# Patient Record
Sex: MALE | Race: OTHER | URBAN - METROPOLITAN AREA
[De-identification: names, ages, dates, MRNs, and addresses within clinical notes are randomized per-mention and may not be internally consistent; named-entity substitution may affect disease eponyms.]

---

## 2019-06-25 NOTE — PATIENT DISCUSSION
HORSESHOE TEAR RIGHT EYE:  Pt has a horseshoe tear at the 10:00 periphery of the right eye. Pt has noted floaters for the past several months. Refer to Dr. Tennille Martinez.

## 2019-06-26 NOTE — PATIENT DISCUSSION
Post-laser (BERNIE) Counseling:  Treatment of retinal breaks with laser greatly reduces but does not completely eliminate the risk of retinal detachment. Keep any scheduled appointments for re-evaluation of the retina, and report any increase in flashing lights or floaters.

## 2019-06-26 NOTE — PATIENT DISCUSSION
RETINAL HORSESHOE TEAR WITH SMALL RETINAL DETACHMENT, OD: RISKS AND BENEFITS OF ALL TX OPTIONS DISCUSSED, INCLUDING LASER RETINOPEXY, PNEUMATIC RETINOPEXY, OR SURGERY WITH PPV AND/OR SCLERAL BUCKLE. THE PATIENT ELECTED TO PROCEED TODAY WITH LASER RETINOPEXY TREATMENT (BERNIE).

## 2019-06-26 NOTE — PATIENT DISCUSSION
PLAQUENIL:  NO EVIDENCE OF RETINAL TOXICITY. CONTINUE ANNUAL SCREENING WITH DILATED EXAM AND OCT MACULA. COORDINATE ANNUAL HVF 10-2 OU WITH REFERRING PROVIDER - DR Coco Araujo.

## 2019-07-03 NOTE — PATIENT DISCUSSION
MAC ON DETACHMENT OD TODAY. UNABLE TO FIND SURGEON TO DO SURGERY THIS WEEK DUE TO HOLIDAY. GAS INJECTED IN OFFICE TODAY. PT WILL COME IN ON FRIDAY FOR IOP CHECK. SCHEDULED FOR MONDAY TO SEE DR Ortiz Navarrete FOR POSSIBLE SURGERY ON TUESDAY. PT TO CALL WITH ANY DECREASE IN VISION OR INCREASE IN PAIN.

## 2019-07-05 NOTE — PATIENT DISCUSSION
New Prescription: atropine (atropine): drops: 1% 1 drop twice a day as directed into affected eye 07-

## 2019-07-05 NOTE — PATIENT DISCUSSION
New Prescription: ciprofloxacin (ciprofloxacin): drops: 0.3% 1 drop four times a day as directed into affected eye 07-

## 2019-07-08 NOTE — PATIENT DISCUSSION
New Prescription: prednisolone acetate (prednisolone acetate): drops,suspension: 1% 1 drop as directed into affected eye 07-

## 2019-07-08 NOTE — PATIENT DISCUSSION
RETINAL DETACHMENT, OD:  ALL TX OPTIONS DISCUSSED INCLUDING OBSERVATION, FILL-IN LASER, OR RETINAL DETACHMENT REPAIR SURGERY. PATIENT ELECTED TO SCHEDULE PPV / Debbora Mary / LASER.

## 2019-07-08 NOTE — PATIENT DISCUSSION
Retinal Detachment Counseling:  Discussed the need for an intra-ocular gas bubble. Reviewed need to avoid air travel, SCUBA diving, or travel at high altitude until bubble resolved. Vision typically does not return completely to normal because of some degree of permanent damage to the retina when the retinal detachment occurs. Even with successful attachment of the retina, there can still be permanent blurriness, distortion, or blockage of part of the central vision.

## 2019-07-10 NOTE — PATIENT DISCUSSION
LEFT SIDE DOWN POSITIONING FOR 1 WEEK. KEEP GAS BRACELET IN PLACE UNTIL BUBBLE GONE.  AVOID AIR TRAVEL, SCUBA DIVING, OR TRAVEL AT ALTITUDE UNTIL GAS BUBBLE HAS RESOLVED.

## 2019-07-10 NOTE — PATIENT DISCUSSION
Continue: prednisolone acetate (prednisolone acetate): drops,suspension: 1% 1 drop as directed into affected eye 07-

## 2019-07-10 NOTE — PATIENT DISCUSSION
Continue: ciprofloxacin (ciprofloxacin): drops: 0.3% 1 drop four times a day as directed into affected eye 07-

## 2019-07-10 NOTE — PATIENT DISCUSSION
POST-OP DAY 1 EXAM: PPV/SF6 OD. Doing well today. Retina attached. IOP within acceptable limits. Start eyedrops in the surgical eye as instructed: Pred 4x/day, Cipro 4x/day, Atropine 2x/day. Take tylenol or ibuprofen for any mild eye pain or pressure. Retinal detachment and endophthalmitis precautions reviewed. Instructed to call immediately for worsening vision, eye pain, or eye discharge.

## 2019-07-17 NOTE — PATIENT DISCUSSION
POST-OP WEEK 1 EXAM: S/P PPV/SF6 OD. Doing well today. Retina attached. IOP within acceptable limits. Continue eyedrops in the surgical eye as instructed. Begin Pred taper. Discontinue Cipro and Atropine. Retinal detachment and endophthalmitis precautions reviewed. Instructed to call immediately for worsening vision, eye pain, or eye discharge.

## 2019-07-31 NOTE — PATIENT DISCUSSION
NO POSITIONING RESTRICTIONS. REVIEWED PVD PRECAUTIONS WITH PATIENT AND ADVISED TO CALL IF EXPERIENCES NEW FLASHES OF LIGHT, INCREASE IN FLOATERS, OR DECREASE VISION IN EITHER EYE.

## 2019-07-31 NOTE — PATIENT DISCUSSION
POST-OP WEEK 3 EXAM: S/P PPV/SF6 OD. Doing well today. Retina attached. IOP within acceptable limits. Continue eyedrops in the surgical eye as instructed. Begin Pred taper. Discontinue Cipro and Atropine. Retinal detachment and endophthalmitis precautions reviewed. Instructed to call immediately for worsening vision, eye pain, or eye discharge.

## 2019-08-28 NOTE — PATIENT DISCUSSION
POST-OP MONTH 2 EXAM: S/P PPV/SF6 OD. Doing well today. Retina attached. IOP within acceptable limits. Continue eyedrops in the surgical eye as instructed. Begin Pred taper. Discontinue Cipro and Atropine. Retinal detachment and endophthalmitis precautions reviewed. Instructed to call immediately for worsening vision, eye pain, or eye discharge.

## 2019-08-28 NOTE — PATIENT DISCUSSION
EPIMACULAR MEMBRANE, OD:  VISUALLY SIGNIFICANT. SECONDARY TO PVR. DISCUSSED SURGICAL OPTIONS - PATIENT AGREED TO CONTINUE TO OBSERVE AT THIS TIME. RETURN AS SCHEDULED FOR OCT / EVALUATION.

## 2019-09-25 NOTE — PATIENT DISCUSSION
New Prescription: atropine (atropine): drops: 1% 1 drop twice a day as directed into affected eye 09-

## 2019-09-25 NOTE — PATIENT DISCUSSION
New Prescription: prednisolone acetate (prednisolone acetate): drops,suspension: 1% 1 drop as directed into affected eye 09-

## 2019-09-25 NOTE — PATIENT DISCUSSION
New Prescription: ciprofloxacin (ciprofloxacin): drops: 0.3% 1 drop four times a day as directed into affected eye 09-

## 2019-09-25 NOTE — PATIENT DISCUSSION
POST-OP MONTH 3 EXAM: S/P PPV/SF6 OD. Doing well today. Retina attached. IOP within acceptable limits. Continue eyedrops in the surgical eye as instructed. Begin Pred taper. Discontinue Cipro and Atropine. Retinal detachment and endophthalmitis precautions reviewed. Instructed to call immediately for worsening vision, eye pain, or eye discharge.

## 2019-10-16 NOTE — PATIENT DISCUSSION
Continue: prednisolone acetate (prednisolone acetate): drops,suspension: 1% 1 drop as directed into affected eye 09-

## 2019-10-16 NOTE — PATIENT DISCUSSION
POST-OP DAY 1 EXAM: S/P PPV/MP OD; Doing well today. Retina attached. IOP within acceptable limits. Start eyedrops in the surgical eye as instructed: Pred 4x/day, Cipro 4x/day, Atropine 2x/day. Take tylenol or ibuprofen for any mild eye pain or pressure. Retinal detachment and endophthalmitis precautions reviewed. Instructed to call immediately for worsening vision, eye pain, or eye discharge.

## 2019-10-23 NOTE — PATIENT DISCUSSION
POST-OP WEEK 1 EXAM: S/P PPV/MP OD; Doing well today. Retina attached. IOP within acceptable limits. Continue eyedrops in the surgical eye as instructed. Begin Pred taper. Discontinue Cipro and Atropine. Retinal detachment and endophthalmitis precautions reviewed. Instructed to call immediately for worsening vision, eye pain, or eye discharge.

## 2019-10-23 NOTE — PATIENT DISCUSSION
Continue: ciprofloxacin (ciprofloxacin): drops: 0.3% 1 drop four times a day as directed into right eye

## 2019-11-13 NOTE — PATIENT DISCUSSION
CYSTOID MACULAR EDEMA, OD: IMPROVING. PRESCRIBED PREDNISOLONE 2X/DAY OD, UNTIL BOTTLE EMPTY. RETURN FOR FOLLOW-UP AS SCHEDULED.

## 2019-11-13 NOTE — PATIENT DISCUSSION
Continue: prednisolone acetate (prednisolone acetate): drops,suspension: 1% 1 drop twice a day into right eye 09-

## 2019-11-13 NOTE — PATIENT DISCUSSION
POST-OP MONTH 1 EXAM: S/P PPV/MP OD; : Doing well today. Retina attached. IOP within acceptable limits. Finish eyedrops in the surgical eye as instructed. Retinal detachment and endophthalmitis precautions reviewed. Instructed to call immediately for worsening vision, eye pain, or eye discharge.

## 2020-01-22 NOTE — PATIENT DISCUSSION
CYSTOID MACULAR EDEMA, OD: IMPROVED AFTER PPV/MEMBRANE PEELING AND THEN SUBTENON TRIAMCINOLONE INJECTION. PATIENT WOULD LIKE TO PROCEED WITH PHACO. RETURN FOR FOLLOW-UP AFTER PHACO TO EVALUATE FOR REPEAT PSTK INJECTION.

## 2020-01-22 NOTE — PATIENT DISCUSSION
RETINA IS ATTACHED OU: S/P PPV OD; S/P LASER RETINOPEXY OS; NO NEW HOLES OR TEARS SEEN ON DILATED EXAM TODAY.  RETINAL DETACHMENT SIGNS AND SYMPTOMS REVIEWED

## 2020-01-22 NOTE — PATIENT DISCUSSION
CLEARED FOR PHACO OU. REFER TO DR RODRIGUEZ FOR VISUALLY SIGNIFICANT CATARACTS OD&gt;OS. DISCUSSED GUARDED VISUAL PROGNOSIS OD, DUE TO HISTORY OF RETINAL DETACHMENT REPAIR.

## 2020-01-22 NOTE — PATIENT DISCUSSION
SLE ON PLAQUENIL:  NO EVIDENCE OF RETINAL TOXICITY. CONTINUE ANNUAL SCREENING WITH DILATED EXAM AND OCT MACULA. COORDINATE ANNUAL HVF 10-2 OU WITH REFERRING PROVIDER.

## 2020-03-16 NOTE — PATIENT DISCUSSION
Kavitha OD&gt;OS - VS TO PT, LIMITED POTENTIAL VA OD DISCUSSED WITH PT INCLUDING WORSENING OF SRF. SCHEDULE OD ONLY. PT WILL NEED LONGTERM NSAID AFTER SURGERY.

## 2020-03-16 NOTE — PATIENT DISCUSSION
DRUSENOID PED OS - NO SIGN OF PLAQUENIL TOXICITY BUT MACULAR PIGMENTARY CHANGES CAN BE RELATIVE CONTRAINDICATION.  PATIENT TO DISCUSS OPTIONS / ALTERNATIVES WITH DR Slava Woodward

## 2020-04-24 NOTE — PATIENT DISCUSSION
RETINA IS ATTACHED OU: S/P PPV MP OD; LASER RETINOPEXY, OU; NO HOLES OR TEARS SEEN ON DILATED EXAM TODAY.  RETINAL DETACHMENT SIGNS AND SYMPTOMS REVIEWED

## 2020-04-24 NOTE — PATIENT DISCUSSION
CYSTOID MACULAR EDEMA, OD:  MODERATE IMPROVEMENT AFTER FIRST PSTK INJECTION. RECOMMEND SUBTENON'S KENALOG INJECTION TODAY.

## 2020-04-24 NOTE — PATIENT DISCUSSION
PLAQUENIL:  DUE TO MACULAR CHANGES, PATIENT WILL DISCUSS ALTERNATIVE Tamar Moore (RHEUMATOLOGY). NO EVIDENCE OF RETINAL TOXICITY. CONTINUE ANNUAL SCREENING WITH DILATED EXAM AND OCT MACULA. COORDINATE ANNUAL HVF 10-2 OU WITH REFERRING PROVIDER DR RODRIGUEZ.

## 2020-05-26 NOTE — PATIENT DISCUSSION
Kavitha OD&gt;OS - VS TO PT, LIMITED POTENTIAL VA OD DISCUSSED WITH PT INCLUDING WORSENING OF MACULAR EDEMA, PROGRESSIVE SRF. PT UNDERSTANDS INCREASED RISK OF COMPLICATIONS AND LIMIT TO BCVA AFTER SX. SCHEDULE OD ONLY. PT WILL NEED LONGTERM NSAID AFTER SURGERY. Jose Kay.

## 2020-05-26 NOTE — PATIENT DISCUSSION
New Prescription: prednisol ace-gatiflox-bromfen (prednisol ace-gatiflox-bromfen): drops,suspension: 1-0.5-0.075% 1 drop three times a day into affected eye 05-

## 2020-05-26 NOTE — PATIENT DISCUSSION
S/P LASER RETINOPEXY, OU; NO HOLES OR TEARS SEEN ON DILATED EXAM TODAY.  RETINAL DETACHMENT SIGNS AND SYMPTOMS REVIEWED

## 2020-05-26 NOTE — PATIENT DISCUSSION
PLAQUENIL - MACULAR PATHOLOGY IS RELATIVE CONTRAINDICATION. DISCUSS ALTERNATIVES DR Tati Womack (RHEUMATOLOGY).  CONTINUE ANNUAL HVF 10-2 OU

## 2020-06-19 NOTE — PATIENT DISCUSSION
Post-Op Instructions: The patient was instructed in the proper use of post-operative eye drops: Omni in the Surgical eye 3x a day for 3 weeks, then discontinue. Call back instructions, retinal detachment and endophthalmitis precautions given.

## 2020-06-19 NOTE — PATIENT DISCUSSION
Continue: prednisol ace-gatiflox-bromfen (prednisol ace-gatiflox-bromfen): drops,suspension: 1-0.5-0.075% 1 drop three times a day into affected eye 05-

## 2020-07-06 NOTE — PATIENT DISCUSSION
S/P CATARACT SURGERY WITH IOL, OD.  DOING WELL. CONTINUE DROPS AS DIRECTED. SPECS RX OFFERED. RETURN FOR FOLLOW-UP IN 6 MONTHS FOR DFE OU OR SOONER IF NEEDED.

## 2020-08-17 NOTE — PATIENT DISCUSSION
CYSTOID MACULAR EDEMA, OD: IMPROVING. RESTART PRED-GATI-BROM 2X/DAY OD UNTIL NEXT VISIT (UNTIL BOTTLE EMPTY). RETURN FOR FOLLOW UP AS SCHEDULED.

## 2020-08-17 NOTE — PATIENT DISCUSSION
PLAQUENIL: NO EVIDENCE OF RETINAL TOXICITY. CONTINUE ANNUAL SCREENING WITH DILATED EXAM AND OCT MACULA. COORDINATE ANNUAL HVF 10-2 OU WITH REFERRING PROVIDER DR RODRIGUEZ.

## 2020-08-17 NOTE — PATIENT DISCUSSION
RETINA IS ATTACHED OU: S/P PPV/MP OD; PVD OS; LASER RETINOPEXY, OU; NO HOLES OR TEARS SEEN ON DILATED EXAM TODAY.  RETINAL DETACHMENT SIGNS AND SYMPTOMS REVIEWED

## 2020-11-06 NOTE — PATIENT DISCUSSION
PLAQUENIL DISCUSSION: I have discussed the implications of Plaquenil with the patient and the potential for retinal injury. Even though this is uncommon, all patients on Plaquenil should have regular eye exams, and should notify the eye doctor immediately if any new visual changes occur.

## 2020-11-06 NOTE — PATIENT DISCUSSION
CYSTOID MACULAR EDEMA, OD: IMPROVING. PRESCRIBE PRED FORTE 2X/DAY OD; PROLENSA 1X/DAY OD (SAMPLE GIVEN). RETURN FOR FOLLOW UP AS SCHEDULED.

## 2020-11-06 NOTE — PATIENT DISCUSSION
New Prescription: prednisolone acetate (prednisolone acetate): drops,suspension: 1% 1 drop twice a day into right eye 11-

## 2021-01-11 NOTE — PATIENT DISCUSSION
CYSTOID MACULAR EDEMA, OD: IMPROVING. PRESCRIBE PRED FORTE 2X/DAY OD; PROLENSA 1X/DAY OD. RETURN FOR FOLLOW UP AS SCHEDULED.

## 2021-01-11 NOTE — PATIENT DISCUSSION
NO EVIDENCE OF RETINAL HOLE OR TEARS ON EXAM AFTER BLUNT TRAUMA. RD PRECUATIONS DISCUSSED. IMPROVING EDEMA OD.  KEEP FU DR Justin Balderrama AS SCHEDULED

## 2021-01-27 NOTE — PATIENT DISCUSSION
RETINA IS ATTACHED OU: S/P PPV/MP OD; PVD OS; S/P LASER RETINOPEXY, OU; NO HOLES OR TEARS SEEN ON DILATED EXAM TODAY.  RETINAL DETACHMENT SIGNS AND SYMPTOMS REVIEWED

## 2021-01-27 NOTE — PATIENT DISCUSSION
CYSTOID MACULAR EDEMA, OD: RESOLVED. STOP PROLENSA. CONTINUE PRED FORTE 1X/DAY FOR 3 MONTHS UNTIL NEXT VISIT. RETURN FOR FOLLOW UP AS SCHEDULED.

## 2021-02-23 NOTE — PATIENT DISCUSSION
Also, please do not hesitate to call us if you have any concerns not addressed by this information. Please call [phone] and we will do everything we can to help you during this period.

## 2021-07-14 NOTE — PATIENT DISCUSSION
Patient presents with persistent ptosis right upper eyelid, gave sample of Upneeq ophthalmic drops. The patient will try the drops and if she would like an rx for it she will call the office. Patient will follow up if persistent ptosis of the right upper eyelid worsens and she would like to proceed with surgery.

## 2021-07-19 NOTE — PATIENT DISCUSSION
MACULAR RETINAL PIGMENT EPITHELIAL DETACHMENT OS:  NO DEFINITE SIGNS OF AGE-RELATED MACULAR DEGENERATION. RETURN AS SCHEDULED.

## 2021-07-19 NOTE — PATIENT DISCUSSION
REQUESTED TO RETURN TO DR RODRIGUEZ FOR GENERAL EXAM WITH REFRACTION AND PHACO EVAL. CLEARED FOR PHACO OS WHEN PATIENT IS READY.

## 2021-07-19 NOTE — PATIENT DISCUSSION
CYSTOID MACULAR EDEMA, OD: RESOLVED. NO TREATMENT REQUIRED AT THIS TIME.  STOP PROLENSA AND PRED FORTE. RETURN FOR FOLLOW UP AS SCHEDULED.

## 2022-02-08 NOTE — PATIENT DISCUSSION
SIGNIFICANT PTOSIS, RIGHT UPPER EYELID; RECOMMEND LEVATOR ADVANCEMENT, RIGHT UPPER EYELID. I have examined the patient and reviewed the photos and visual fields.  The upper eyelid margin in ptosis obstructs the visual axis causing  impairment of the peripheral visual field which improves with taping.  I have discussed with the patient the option of levator advancement surgery to lift the upper eyelid position and improve the functional visual field.  We have discussed the risks and benefits of the surgery at length as well as the location of the incision and the recovery process.  The patient understands the surgery, has had all questions answered   and desires to proceed with the surgery as explained.

## 2022-06-30 RX ORDER — PANTOPRAZOLE SODIUM 40 MG/1
TABLET, DELAYED RELEASE ORAL
COMMUNITY

## 2022-06-30 RX ORDER — AZELASTINE 1 MG/ML
SPRAY, METERED NASAL
COMMUNITY

## 2022-06-30 RX ORDER — VENLAFAXINE 75 MG/1
TABLET ORAL
COMMUNITY

## 2022-06-30 RX ORDER — FLUTICASONE PROPIONATE 50 MCG
SPRAY, SUSPENSION (ML) NASAL
COMMUNITY

## 2022-06-30 RX ORDER — MONTELUKAST SODIUM 10 MG/1
TABLET ORAL
COMMUNITY

## 2022-06-30 RX ORDER — LISINOPRIL 10 MG/1
TABLET ORAL
COMMUNITY
Start: 2021-11-04 | End: 2022-10-10 | Stop reason: SDUPTHER

## 2022-06-30 RX ORDER — TRAZODONE HYDROCHLORIDE 50 MG/1
TABLET ORAL
COMMUNITY

## 2022-06-30 RX ORDER — ACYCLOVIR 400 MG/1
TABLET ORAL
COMMUNITY

## 2022-06-30 RX ORDER — ROSUVASTATIN CALCIUM 10 MG/1
TABLET, COATED ORAL
COMMUNITY

## 2022-07-29 NOTE — PATIENT DISCUSSION
VISUALLY SIGNIFICANT: The risks, benefits and alternatives of surgery were discussed with the patient. After this discussion, I recommend waiting on surgery at this time.

## 2022-08-18 NOTE — PATIENT DISCUSSION
Monitor.

## 2022-11-09 ENCOUNTER — NEW PATIENT (OUTPATIENT)
Dept: URBAN - METROPOLITAN AREA CLINIC 14 | Facility: CLINIC | Age: 62
End: 2022-11-09

## 2022-11-09 DIAGNOSIS — H40.053: ICD-10-CM

## 2022-11-09 PROCEDURE — 92002 INTRM OPH EXAM NEW PATIENT: CPT

## 2022-11-09 ASSESSMENT — KERATOMETRY
OS_K2POWER_DIOPTERS: 44.75
OD_AXISANGLE2_DEGREES: 3
OS_AXISANGLE_DEGREES: 165
OD_AXISANGLE_DEGREES: 93
OS_K1POWER_DIOPTERS: 43.75
OD_K1POWER_DIOPTERS: 43.50
OD_K2POWER_DIOPTERS: 44.00
OS_AXISANGLE2_DEGREES: 75

## 2022-11-09 ASSESSMENT — VISUAL ACUITY
OS_SC: CF 3FT
OS_CC: 20/400
OS_BCVA: 20/50
OD_SC: 20/100
OD_CC: 20/30
OD_BCVA: 20/20

## 2022-11-09 ASSESSMENT — TONOMETRY
OD_IOP_MMHG: 27
OD_IOP_MMHG: 27
OS_IOP_MMHG: 37
OS_IOP_MMHG: 49
OS_IOP_MMHG: 45
OS_IOP_MMHG: 46
OS_IOP_MMHG: 29

## 2022-11-10 ENCOUNTER — ESTABLISHED PATIENT (OUTPATIENT)
Dept: URBAN - METROPOLITAN AREA CLINIC 14 | Facility: CLINIC | Age: 62
End: 2022-11-10

## 2022-11-10 DIAGNOSIS — H40.1132: ICD-10-CM

## 2022-11-10 DIAGNOSIS — H25.13: ICD-10-CM

## 2022-11-10 PROCEDURE — 92136 OPHTHALMIC BIOMETRY: CPT

## 2022-11-10 PROCEDURE — 99214 OFFICE O/P EST MOD 30 MIN: CPT

## 2022-11-10 PROCEDURE — 92133 CPTRZD OPH DX IMG PST SGM ON: CPT

## 2022-11-10 PROCEDURE — 99199ADVT ADVANCED VISION TESTING

## 2022-11-10 ASSESSMENT — TONOMETRY
OD_IOP_MMHG: 13
OS_IOP_MMHG: 15

## 2022-11-10 ASSESSMENT — KERATOMETRY
OS_K1POWER_DIOPTERS: 43.75
OS_AXISANGLE_DEGREES: 165
OS_K2POWER_DIOPTERS: 44.75
OD_AXISANGLE_DEGREES: 93
OD_AXISANGLE2_DEGREES: 3
OD_K2POWER_DIOPTERS: 44.00
OD_K1POWER_DIOPTERS: 43.50
OS_AXISANGLE2_DEGREES: 75

## 2022-11-10 ASSESSMENT — VISUAL ACUITY
OD_BCVA: 20/20
OS_SC: CF 3FT
OD_SC: 20/100
OD_CC: 20/30
OS_CC: 20/60
OS_BCVA: 20/50

## 2022-11-16 ENCOUNTER — ESTABLISHED PATIENT (OUTPATIENT)
Dept: URBAN - METROPOLITAN AREA CLINIC 14 | Facility: CLINIC | Age: 62
End: 2022-11-16

## 2022-11-29 ENCOUNTER — ESTABLISHED PATIENT (OUTPATIENT)
Dept: URBAN - METROPOLITAN AREA CLINIC 9 | Facility: CLINIC | Age: 62
End: 2022-11-29

## 2022-11-29 DIAGNOSIS — H25.13: ICD-10-CM

## 2022-11-29 DIAGNOSIS — H52.7: ICD-10-CM

## 2022-11-29 PROCEDURE — 99211NC NO CHARGE VISIT

## 2022-11-29 ASSESSMENT — KERATOMETRY
OD_AXISANGLE2_DEGREES: 3
OS_K2POWER_DIOPTERS: 44.75
OD_AXISANGLE_DEGREES: 93
OS_AXISANGLE2_DEGREES: 75
OS_K1POWER_DIOPTERS: 43.75
OD_K2POWER_DIOPTERS: 44.00
OS_AXISANGLE_DEGREES: 165
OD_K1POWER_DIOPTERS: 43.50

## 2022-11-29 ASSESSMENT — VISUAL ACUITY
OS_CC: 20/200
OD_CC: 20/40
OS_PH: 20/60

## 2022-12-01 ASSESSMENT — KERATOMETRY
OS_AXISANGLE2_DEGREES: 75
OD_K2POWER_DIOPTERS: 44.00
OD_AXISANGLE_DEGREES: 93
OD_AXISANGLE2_DEGREES: 3
OS_K2POWER_DIOPTERS: 44.75
OS_K1POWER_DIOPTERS: 43.75
OS_AXISANGLE_DEGREES: 165
OD_K1POWER_DIOPTERS: 43.50

## 2023-01-04 ENCOUNTER — OFFICE (OUTPATIENT)
Dept: URBAN - METROPOLITAN AREA CLINIC 122 | Facility: CLINIC | Age: 63
Setting detail: OPHTHALMOLOGY
End: 2023-01-04
Payer: COMMERCIAL

## 2023-01-04 DIAGNOSIS — H25.13: ICD-10-CM

## 2023-01-04 DIAGNOSIS — H40.013: ICD-10-CM

## 2023-01-04 DIAGNOSIS — H33.002: ICD-10-CM

## 2023-01-04 DIAGNOSIS — H43.393: ICD-10-CM

## 2023-01-04 DIAGNOSIS — H35.372: ICD-10-CM

## 2023-01-04 PROCEDURE — 92133 CPTRZD OPH DX IMG PST SGM ON: CPT | Performed by: OPHTHALMOLOGY

## 2023-01-04 PROCEDURE — 92012 INTRM OPH EXAM EST PATIENT: CPT | Performed by: OPHTHALMOLOGY

## 2023-01-04 ASSESSMENT — REFRACTION_CURRENTRX
OS_VPRISM_DIRECTION: SV
OS_CYLINDER: -0.50
OD_SPHERE: -0.50
OS_SPHERE: -3.75
OD_CYLINDER: -0.75
OD_OVR_VA: 20/
OS_OVR_VA: 20/
OS_AXIS: 155
OS_ADD: +2.50
OD_AXIS: 105
OD_ADD: +2.50
OD_VPRISM_DIRECTION: SV

## 2023-01-04 ASSESSMENT — REFRACTION_AUTOREFRACTION
OD_AXIS: 110
OS_CYLINDER: -0.75
OD_CYLINDER: -0.75
OD_SPHERE: -0.50
OS_SPHERE: -3.50
OS_AXIS: 111

## 2023-01-04 ASSESSMENT — CONFRONTATIONAL VISUAL FIELD TEST (CVF)
OS_FINDINGS: FULL
OD_FINDINGS: FULL

## 2023-01-04 ASSESSMENT — TONOMETRY
OD_IOP_MMHG: 18
OS_IOP_MMHG: 16

## 2023-01-04 ASSESSMENT — REFRACTION_MANIFEST
OD_AXIS: 105
OD_CYLINDER: -0.75
OS_AXIS: 155
OD_ADD: +2.50
OS_ADD: +2.50
OS_SPHERE: -3.75
OD_SPHERE: -0.50
OS_CYLINDER: -0.50

## 2023-01-04 ASSESSMENT — SPHEQUIV_DERIVED
OS_SPHEQUIV: -3.875
OD_SPHEQUIV: -0.875
OS_SPHEQUIV: -4
OD_SPHEQUIV: -0.875

## 2023-01-04 ASSESSMENT — VISUAL ACUITY
OS_BCVA: 20/20
OD_BCVA: 20/100

## 2023-01-04 ASSESSMENT — PACHYMETRY
OD_CT_CORRECTION: 1
OS_CT_UM: 527
OS_CT_CORRECTION: 1
OD_CT_UM: 527

## 2023-01-11 NOTE — PATIENT DISCUSSION
LETTER TO DR Crisostomo Friend: KINDLY REQUEST MEDICAL CLEARANCE FORM COMPLETED FOR MAC ANESTHESIA. No

## 2023-01-18 ENCOUNTER — POST-OP (OUTPATIENT)
Dept: URBAN - METROPOLITAN AREA CLINIC 6 | Facility: CLINIC | Age: 63
End: 2023-01-18

## 2023-01-18 DIAGNOSIS — Z98.890: ICD-10-CM

## 2023-01-18 DIAGNOSIS — Z96.1: ICD-10-CM

## 2023-01-18 PROCEDURE — 99024 POSTOP FOLLOW-UP VISIT: CPT

## 2023-01-18 ASSESSMENT — KERATOMETRY
OD_AXISANGLE2_DEGREES: 3
OD_K1POWER_DIOPTERS: 43.50
OS_K1POWER_DIOPTERS: 43.75
OS_AXISANGLE_DEGREES: 165
OD_K2POWER_DIOPTERS: 44.00
OD_AXISANGLE_DEGREES: 93
OS_AXISANGLE2_DEGREES: 75
OS_K2POWER_DIOPTERS: 44.75

## 2023-01-18 ASSESSMENT — VISUAL ACUITY
OD_SC: 20/50-1
OS_CC: 20/20

## 2023-01-18 ASSESSMENT — TONOMETRY
OS_IOP_MMHG: 12
OD_IOP_MMHG: 12

## 2023-04-04 ENCOUNTER — ESTABLISHED PATIENT (OUTPATIENT)
Dept: URBAN - METROPOLITAN AREA CLINIC 9 | Facility: CLINIC | Age: 63
End: 2023-04-04

## 2023-04-04 DIAGNOSIS — H26.492: ICD-10-CM

## 2023-04-04 PROCEDURE — 99214 OFFICE O/P EST MOD 30 MIN: CPT

## 2023-04-04 ASSESSMENT — VISUAL ACUITY
OS_CC: 20/25-1
OS_GLARE: 20/80
OS_BCVA: 20/25-1
OD_GLARE: 20/400
OD_CC: 20/20-1
OD_BCVA: 20/20

## 2023-04-04 ASSESSMENT — KERATOMETRY
OD_K2POWER_DIOPTERS: 44.25
OD_AXISANGLE2_DEGREES: 55
OS_AXISANGLE2_DEGREES: 84
OS_K1POWER_DIOPTERS: 43.50
OS_AXISANGLE_DEGREES: 174
OS_K2POWER_DIOPTERS: 44.50
OD_AXISANGLE_DEGREES: 145
OD_K1POWER_DIOPTERS: 43.75

## 2023-04-04 ASSESSMENT — TONOMETRY
OD_IOP_MMHG: 15
OS_IOP_MMHG: 14

## 2023-05-17 ENCOUNTER — ESTABLISHED PATIENT (OUTPATIENT)
Dept: URBAN - METROPOLITAN AREA CLINIC 14 | Facility: CLINIC | Age: 63
End: 2023-05-17

## 2023-05-17 DIAGNOSIS — Z98.890: ICD-10-CM

## 2023-05-17 PROCEDURE — 99024 POSTOP FOLLOW-UP VISIT: CPT

## 2023-05-17 ASSESSMENT — TONOMETRY
OD_IOP_MMHG: 13
OS_IOP_MMHG: 13

## 2023-05-17 ASSESSMENT — VISUAL ACUITY
OD_SC: 20/30
OS_BCVA: 20/25
OD_BCVA: 20/20
OS_SC: 20/25

## 2023-06-12 ENCOUNTER — OFFICE (OUTPATIENT)
Dept: URBAN - METROPOLITAN AREA CLINIC 122 | Facility: CLINIC | Age: 63
Setting detail: OPHTHALMOLOGY
End: 2023-06-12
Payer: COMMERCIAL

## 2023-06-12 DIAGNOSIS — H43.393: ICD-10-CM

## 2023-06-12 DIAGNOSIS — H25.11: ICD-10-CM

## 2023-06-12 DIAGNOSIS — H10.012: ICD-10-CM

## 2023-06-12 DIAGNOSIS — H40.013: ICD-10-CM

## 2023-06-12 DIAGNOSIS — H33.002: ICD-10-CM

## 2023-06-12 DIAGNOSIS — H35.372: ICD-10-CM

## 2023-06-12 PROCEDURE — 92012 INTRM OPH EXAM EST PATIENT: CPT | Performed by: OPHTHALMOLOGY

## 2023-06-12 ASSESSMENT — REFRACTION_AUTOREFRACTION
OS_SPHERE: -3.50
OD_CYLINDER: -0.75
OD_SPHERE: -0.50
OS_AXIS: 111
OD_AXIS: 110
OS_CYLINDER: -0.75

## 2023-06-12 ASSESSMENT — REFRACTION_MANIFEST
OS_SPHERE: -3.75
OS_AXIS: 155
OS_ADD: +2.50
OS_CYLINDER: -0.50
OD_SPHERE: -0.50
OD_AXIS: 105
OD_CYLINDER: -0.75
OD_ADD: +2.50

## 2023-06-12 ASSESSMENT — TONOMETRY
OS_IOP_MMHG: 12
OD_IOP_MMHG: 14

## 2023-06-12 ASSESSMENT — REFRACTION_CURRENTRX
OD_VPRISM_DIRECTION: SV
OD_CYLINDER: -0.75
OS_AXIS: 155
OD_AXIS: 105
OD_SPHERE: -0.50
OS_VPRISM_DIRECTION: SV
OS_OVR_VA: 20/
OS_CYLINDER: -0.50
OD_ADD: +2.50
OS_ADD: +2.50
OS_SPHERE: -3.75
OD_OVR_VA: 20/

## 2023-06-12 ASSESSMENT — SPHEQUIV_DERIVED
OD_SPHEQUIV: -0.875
OS_SPHEQUIV: -3.875
OS_SPHEQUIV: -4
OD_SPHEQUIV: -0.875

## 2023-06-12 ASSESSMENT — VISUAL ACUITY
OS_BCVA: 20/50-
OD_BCVA: 20/25+2

## 2023-06-12 ASSESSMENT — PACHYMETRY
OS_CT_UM: 527
OD_CT_UM: 527
OD_CT_CORRECTION: 1
OS_CT_CORRECTION: 1

## 2023-06-23 NOTE — PATIENT DISCUSSION
"The patient is given the patient education document:  ""Eyelid Disorders""
Alejandro Harris M.D.
Amberly العلي M.D.
COUNSELING:
Comments:
Communication Method: Direct
Communication Method: Fax
Complete
External photos, Right Eye (Today)
Eye Tests:
General:
HVF - Suprathreshold, Right Eye (Today)
Kan Visual Field 36 point screen: I have reviewed the visual fields both taped and untaped on this patient which demonstrate significant obstruction of the patient's peripheral visual field on the right eye.
Letter:  Follow-up
Monica Ramirez M.D.
Notify Provider:
PEGGY GonzalezP
PHOTOGRAPHS: I have reviewed the external ocular photographs of this patient which show the following: significant ptosis of the right upper eyelid.
PTOSIS, OD:  SCHEDULE LEVATOR ADVANCEMENT, OD.
Patient Education:
Ptosis Counseling:  I have examined the patient and reviewed the photos and visual fields. The upper eyelid margin in ptosis obstructs the visual axis causing  impairment of the peripheral visual field which improves with taping. I have discussed with the patient the option of levator advancement surgery to lift the upper eyelid position and improve the functional visual field. We have discussed the risks and benefits of the surgery at length as well as the location of the incision and the recovery process. The patient understands the surgery, has had all questions answered   and desires to proceed with the surgery as explained.
<<-----Click here for Discharge Medication Review

## 2023-06-28 ENCOUNTER — OFFICE (OUTPATIENT)
Dept: URBAN - METROPOLITAN AREA CLINIC 122 | Facility: CLINIC | Age: 63
Setting detail: OPHTHALMOLOGY
End: 2023-06-28
Payer: COMMERCIAL

## 2023-06-28 DIAGNOSIS — H35.372: ICD-10-CM

## 2023-06-28 DIAGNOSIS — H33.002: ICD-10-CM

## 2023-06-28 DIAGNOSIS — H35.363: ICD-10-CM

## 2023-06-28 DIAGNOSIS — H40.1131: ICD-10-CM

## 2023-06-28 DIAGNOSIS — H25.11: ICD-10-CM

## 2023-06-28 DIAGNOSIS — H16.223: ICD-10-CM

## 2023-06-28 DIAGNOSIS — H43.393: ICD-10-CM

## 2023-06-28 DIAGNOSIS — H10.012: ICD-10-CM

## 2023-06-28 PROCEDURE — 92012 INTRM OPH EXAM EST PATIENT: CPT | Performed by: OPHTHALMOLOGY

## 2023-06-28 PROCEDURE — 92083 EXTENDED VISUAL FIELD XM: CPT | Performed by: OPHTHALMOLOGY

## 2023-06-28 PROCEDURE — 92250 FUNDUS PHOTOGRAPHY W/I&R: CPT | Performed by: OPHTHALMOLOGY

## 2023-06-28 ASSESSMENT — TEAR BREAK UP TIME (TBUT)
OD_TBUT: 2+
OS_TBUT: 2+

## 2023-06-28 ASSESSMENT — REFRACTION_MANIFEST
OD_SPHERE: -0.50
OD_AXIS: 105
OS_CYLINDER: -0.50
OS_ADD: +2.50
OD_ADD: +2.50
OD_SPHERE: -1.00
OS_AXIS: 155
OD_CYLINDER: -0.75
OS_SPHERE: -3.75

## 2023-06-28 ASSESSMENT — REFRACTION_CURRENTRX
OD_VPRISM_DIRECTION: SV
OS_AXIS: 175
OS_SPHERE: +0.50
OS_OVR_VA: 20/
OD_AXIS: 105
OS_CYLINDER: -1.00
OD_CYLINDER: -0.75
OS_ADD: +2.50
OS_AXIS: 155
OD_AXIS: 120
OD_SPHERE: -0.50
OS_CYLINDER: -0.50
OS_OVR_VA: 20/
OD_CYLINDER: -0.75
OD_SPHERE: -0.50
OD_OVR_VA: 20/
OS_SPHERE: -3.75
OS_VPRISM_DIRECTION: SV
OD_ADD: +2.50
OD_OVR_VA: 20/

## 2023-06-28 ASSESSMENT — REFRACTION_AUTOREFRACTION
OD_AXIS: 110
OD_CYLINDER: -0.75
OS_AXIS: 111
OS_SPHERE: -3.50
OS_CYLINDER: -0.75
OD_SPHERE: -0.50

## 2023-06-28 ASSESSMENT — PACHYMETRY
OS_CT_UM: 527
OD_CT_CORRECTION: 1
OD_CT_UM: 527
OS_CT_CORRECTION: 1

## 2023-06-28 ASSESSMENT — TONOMETRY
OS_IOP_MMHG: 15
OD_IOP_MMHG: 15

## 2023-06-28 ASSESSMENT — SPHEQUIV_DERIVED
OD_SPHEQUIV: -0.875
OS_SPHEQUIV: -3.875
OS_SPHEQUIV: -4
OD_SPHEQUIV: -0.875

## 2023-06-28 ASSESSMENT — VISUAL ACUITY
OD_BCVA: 20/25+2
OS_BCVA: 20/50-

## 2023-11-17 ENCOUNTER — OFFICE (OUTPATIENT)
Dept: URBAN - METROPOLITAN AREA CLINIC 122 | Facility: CLINIC | Age: 63
Setting detail: OPHTHALMOLOGY
End: 2023-11-17
Payer: COMMERCIAL

## 2023-11-17 DIAGNOSIS — H35.363: ICD-10-CM

## 2023-11-17 DIAGNOSIS — H33.002: ICD-10-CM

## 2023-11-17 DIAGNOSIS — H43.393: ICD-10-CM

## 2023-11-17 DIAGNOSIS — H25.11: ICD-10-CM

## 2023-11-17 DIAGNOSIS — H16.223: ICD-10-CM

## 2023-11-17 DIAGNOSIS — H02.9: ICD-10-CM

## 2023-11-17 DIAGNOSIS — H40.1131: ICD-10-CM

## 2023-11-17 DIAGNOSIS — H35.372: ICD-10-CM

## 2023-11-17 PROCEDURE — 92014 COMPRE OPH EXAM EST PT 1/>: CPT | Performed by: OPHTHALMOLOGY

## 2023-11-17 PROCEDURE — 92133 CPTRZD OPH DX IMG PST SGM ON: CPT | Performed by: OPHTHALMOLOGY

## 2023-11-17 ASSESSMENT — REFRACTION_CURRENTRX
OS_AXIS: 155
OD_OVR_VA: 20/
OS_OVR_VA: 20/
OD_CYLINDER: -0.75
OS_AXIS: 175
OD_CYLINDER: -0.75
OD_AXIS: 105
OS_OVR_VA: 20/
OS_VPRISM_DIRECTION: SV
OS_CYLINDER: -1.00
OS_ADD: +2.50
OD_AXIS: 120
OS_SPHERE: +0.50
OD_VPRISM_DIRECTION: SV
OS_SPHERE: -3.75
OS_CYLINDER: -0.50
OD_ADD: +2.50
OD_OVR_VA: 20/
OD_SPHERE: -0.50
OD_SPHERE: -0.50

## 2023-11-17 ASSESSMENT — LID EXAM ASSESSMENTS
OD_EDEMA: RLL 1+
OS_EDEMA: LLL 1+

## 2023-11-17 ASSESSMENT — REFRACTION_MANIFEST
OS_SPHERE: -3.75
OS_AXIS: 155
OD_SPHERE: -1.00
OD_AXIS: 105
OD_CYLINDER: -0.75
OD_ADD: +2.50
OS_ADD: +2.50
OD_SPHERE: -0.50
OS_CYLINDER: -0.50

## 2023-11-17 ASSESSMENT — REFRACTION_AUTOREFRACTION
OD_CYLINDER: -0.50
OD_SPHERE: -0.50
OD_AXIS: 119
OS_CYLINDER: -0.50
OS_AXIS: 174
OS_SPHERE: PLANO

## 2023-11-17 ASSESSMENT — SPHEQUIV_DERIVED
OS_SPHEQUIV: -4
OD_SPHEQUIV: -0.75
OD_SPHEQUIV: -0.875

## 2023-11-17 ASSESSMENT — TEAR BREAK UP TIME (TBUT)
OS_TBUT: 2+
OD_TBUT: 2+

## 2023-12-18 ENCOUNTER — RX ONLY (RX ONLY)
Age: 63
End: 2023-12-18

## 2023-12-18 ENCOUNTER — OFFICE (OUTPATIENT)
Dept: URBAN - METROPOLITAN AREA CLINIC 122 | Facility: CLINIC | Age: 63
Setting detail: OPHTHALMOLOGY
End: 2023-12-18
Payer: COMMERCIAL

## 2023-12-18 DIAGNOSIS — H16.223: ICD-10-CM

## 2023-12-18 DIAGNOSIS — H35.363: ICD-10-CM

## 2023-12-18 DIAGNOSIS — H25.11: ICD-10-CM

## 2023-12-18 DIAGNOSIS — H40.1131: ICD-10-CM

## 2023-12-18 DIAGNOSIS — H35.372: ICD-10-CM

## 2023-12-18 DIAGNOSIS — H43.393: ICD-10-CM

## 2023-12-18 DIAGNOSIS — H10.013: ICD-10-CM

## 2023-12-18 DIAGNOSIS — H33.002: ICD-10-CM

## 2023-12-18 PROBLEM — H10.012 FOLLICULAR CONJUNCTIVITS ACUTE; LEFT EYE: Status: ACTIVE | Noted: 2023-12-18

## 2023-12-18 PROCEDURE — 92134 CPTRZ OPH DX IMG PST SGM RTA: CPT | Performed by: OPHTHALMOLOGY

## 2023-12-18 PROCEDURE — 92012 INTRM OPH EXAM EST PATIENT: CPT | Performed by: OPHTHALMOLOGY

## 2023-12-18 ASSESSMENT — REFRACTION_CURRENTRX
OS_AXIS: 155
OS_CYLINDER: -0.50
OD_CYLINDER: -0.75
OD_SPHERE: -0.50
OD_AXIS: 120
OS_CYLINDER: -1.00
OD_VPRISM_DIRECTION: SV
OS_ADD: +2.50
OS_OVR_VA: 20/
OD_OVR_VA: 20/
OD_SPHERE: -0.50
OD_CYLINDER: -0.75
OS_SPHERE: -3.75
OS_AXIS: 175
OD_OVR_VA: 20/
OD_AXIS: 105
OS_OVR_VA: 20/
OD_ADD: +2.50
OS_SPHERE: +0.50
OS_VPRISM_DIRECTION: SV

## 2023-12-18 ASSESSMENT — REFRACTION_AUTOREFRACTION
OD_AXIS: 119
OS_SPHERE: PLANO
OS_CYLINDER: -0.50
OS_AXIS: 174
OD_SPHERE: -0.50
OD_CYLINDER: -0.50

## 2023-12-18 ASSESSMENT — LID EXAM ASSESSMENTS
OD_EDEMA: ABSENT
OS_EDEMA: ABSENT

## 2023-12-18 ASSESSMENT — REFRACTION_MANIFEST
OS_SPHERE: -3.75
OD_SPHERE: -1.00
OS_CYLINDER: -0.50
OD_CYLINDER: -0.75
OS_AXIS: 155
OD_ADD: +2.50
OD_AXIS: 105
OD_SPHERE: -0.50
OS_ADD: +2.50

## 2023-12-18 ASSESSMENT — SPHEQUIV_DERIVED
OD_SPHEQUIV: -0.75
OD_SPHEQUIV: -0.875
OS_SPHEQUIV: -4

## 2023-12-18 ASSESSMENT — TEAR BREAK UP TIME (TBUT)
OD_TBUT: 2+
OS_TBUT: 2+

## 2024-01-03 ENCOUNTER — RX ONLY (RX ONLY)
Age: 64
End: 2024-01-03

## 2024-01-03 ENCOUNTER — OFFICE (OUTPATIENT)
Dept: URBAN - METROPOLITAN AREA CLINIC 122 | Facility: CLINIC | Age: 64
Setting detail: OPHTHALMOLOGY
End: 2024-01-03
Payer: COMMERCIAL

## 2024-01-03 DIAGNOSIS — H40.1131: ICD-10-CM

## 2024-01-03 DIAGNOSIS — H35.363: ICD-10-CM

## 2024-01-03 DIAGNOSIS — H25.11: ICD-10-CM

## 2024-01-03 DIAGNOSIS — H33.002: ICD-10-CM

## 2024-01-03 DIAGNOSIS — H43.393: ICD-10-CM

## 2024-01-03 DIAGNOSIS — H16.223: ICD-10-CM

## 2024-01-03 DIAGNOSIS — H10.013: ICD-10-CM

## 2024-01-03 DIAGNOSIS — H35.372: ICD-10-CM

## 2024-01-03 PROBLEM — H10.012 FOLLICULAR CONJUNCTIVITS ACUTE; LEFT EYE: Status: ACTIVE | Noted: 2024-01-03

## 2024-01-03 PROCEDURE — 92012 INTRM OPH EXAM EST PATIENT: CPT | Performed by: OPHTHALMOLOGY

## 2024-01-03 ASSESSMENT — REFRACTION_CURRENTRX
OD_AXIS: 105
OD_AXIS: 120
OS_CYLINDER: -1.00
OD_VPRISM_DIRECTION: SV
OS_ADD: +2.50
OD_OVR_VA: 20/
OS_SPHERE: +0.50
OS_AXIS: 175
OS_SPHERE: -3.75
OS_OVR_VA: 20/
OD_SPHERE: -0.50
OS_OVR_VA: 20/
OD_OVR_VA: 20/
OD_ADD: +2.50
OS_CYLINDER: -0.50
OD_SPHERE: -0.50
OD_CYLINDER: -0.75
OD_CYLINDER: -0.75
OS_VPRISM_DIRECTION: SV
OS_AXIS: 155

## 2024-01-03 ASSESSMENT — SPHEQUIV_DERIVED
OS_SPHEQUIV: -4
OD_SPHEQUIV: -0.875
OD_SPHEQUIV: -0.75

## 2024-01-03 ASSESSMENT — REFRACTION_AUTOREFRACTION
OD_CYLINDER: -0.50
OS_CYLINDER: -0.50
OS_SPHERE: PLANO
OS_AXIS: 174
OD_AXIS: 119
OD_SPHERE: -0.50

## 2024-01-03 ASSESSMENT — REFRACTION_MANIFEST
OD_ADD: +2.50
OS_SPHERE: -3.75
OS_CYLINDER: -0.50
OD_SPHERE: -1.00
OS_ADD: +2.50
OD_CYLINDER: -0.75
OD_AXIS: 105
OD_SPHERE: -0.50
OS_AXIS: 155

## 2024-01-03 ASSESSMENT — LID EXAM ASSESSMENTS
OS_EDEMA: ABSENT
OD_EDEMA: ABSENT

## 2024-01-03 ASSESSMENT — CONFRONTATIONAL VISUAL FIELD TEST (CVF)
OS_FINDINGS: FULL
OD_FINDINGS: FULL

## 2024-01-03 ASSESSMENT — TEAR BREAK UP TIME (TBUT)
OS_TBUT: 2+
OD_TBUT: 2+

## 2024-04-29 ENCOUNTER — OFFICE (OUTPATIENT)
Dept: URBAN - METROPOLITAN AREA CLINIC 122 | Facility: CLINIC | Age: 64
Setting detail: OPHTHALMOLOGY
End: 2024-04-29
Payer: COMMERCIAL

## 2024-04-29 DIAGNOSIS — H33.002: ICD-10-CM

## 2024-04-29 DIAGNOSIS — H16.223: ICD-10-CM

## 2024-04-29 DIAGNOSIS — H35.363: ICD-10-CM

## 2024-04-29 DIAGNOSIS — H40.1131: ICD-10-CM

## 2024-04-29 DIAGNOSIS — H25.11: ICD-10-CM

## 2024-04-29 DIAGNOSIS — H02.824: ICD-10-CM

## 2024-04-29 PROBLEM — H10.012 FOLLICULAR CONJUNCTIVITS ACUTE; LEFT EYE: Status: RESOLVED | Noted: 2024-04-29 | Resolved: 2024-04-29

## 2024-04-29 PROCEDURE — 92012 INTRM OPH EXAM EST PATIENT: CPT | Performed by: OPHTHALMOLOGY

## 2024-04-29 PROCEDURE — 92083 EXTENDED VISUAL FIELD XM: CPT | Performed by: OPHTHALMOLOGY

## 2024-04-29 ASSESSMENT — LID EXAM ASSESSMENTS
OD_EDEMA: ABSENT
OS_EDEMA: ABSENT

## 2024-06-18 ENCOUNTER — OFFICE (OUTPATIENT)
Dept: URBAN - METROPOLITAN AREA CLINIC 122 | Facility: CLINIC | Age: 64
Setting detail: OPHTHALMOLOGY
End: 2024-06-18
Payer: COMMERCIAL

## 2024-06-18 DIAGNOSIS — H52.7: ICD-10-CM

## 2024-06-18 DIAGNOSIS — H40.1131: ICD-10-CM

## 2024-06-18 PROCEDURE — 92012 INTRM OPH EXAM EST PATIENT: CPT

## 2024-06-18 PROCEDURE — 92015 DETERMINE REFRACTIVE STATE: CPT

## 2024-06-18 ASSESSMENT — LID EXAM ASSESSMENTS
OD_EDEMA: ABSENT
OS_EDEMA: ABSENT

## 2024-08-26 ENCOUNTER — OFFICE (OUTPATIENT)
Dept: URBAN - METROPOLITAN AREA CLINIC 122 | Facility: CLINIC | Age: 64
Setting detail: OPHTHALMOLOGY
End: 2024-08-26
Payer: COMMERCIAL

## 2024-08-26 DIAGNOSIS — H40.1131: ICD-10-CM

## 2024-08-26 DIAGNOSIS — H16.223: ICD-10-CM

## 2024-08-26 PROCEDURE — 92012 INTRM OPH EXAM EST PATIENT: CPT | Performed by: OPHTHALMOLOGY

## 2024-08-26 PROCEDURE — 92133 CPTRZD OPH DX IMG PST SGM ON: CPT | Performed by: OPHTHALMOLOGY

## 2024-08-26 ASSESSMENT — LID EXAM ASSESSMENTS
OD_EDEMA: ABSENT
OS_EDEMA: ABSENT

## 2024-09-16 NOTE — PATIENT DISCUSSION
Continued Stay SW/CM Assessment/Plan of Care Note       Active Substitute Decision Maker (SDM)    There are no active Substitute Decision Maker (SDM) on file.           Progress note:  [facility] scheduled for 11 am.    See SW/ flowsheets for other objective data.    Disposition Recommendations:  Preliminary discharge destination:    SW/CM recommendation for discharge: Home care    Destination Pharmacy:        Discharge Plan/Needs:     Continued Care and Services - Admitted Since 9/11/2024       Home Medical Care Coordination complete.      Service Provider Request Status Selected Services Address Phone Fax    [facility]  Selected Home Health Services [address] [place] [id number] [phone]                      Devices/ Equipment that need to be arranged for discharge:     Accepted   Pending insurance authorization   Others:    Anticipated date of DME availability:     Prior To Hospitalization:    Living Situation: Alone and residing at House    .  Support Systems: Family members   Home Devices/Equipment: Mobility assist device, Shower chair, Elevated toilet seat, Blood pressure monitor            Mobility Assist Devices: Cane, Standard walker   Type of Service Prior to Hospitalization: None, Homemaker               Patient/Family discharge goal (s):  Home Care     Resources provided:           Prior Function                Current Function  Last Filed Values         Value Time User    Current Function  significantly below baseline level of function 9/12/2024  1:43 PM Vida Capellan PT    Therapy Impairments  activity tolerance; pain; ROM; strength 9/12/2024  1:43 PM Vida Capellan PT    ADLs Requiring Support  bed mobility; transfers; ambulation; stairs 9/12/2024  1:43 PM Vida Capellan PT            Therapy Recommendations for Discharge:   PT:      Last Filed Values         Value Time User    PT Discharge Needs  therapy 1-3 times per week 9/12/2024  1:43 PM Marilia  [address] [facility]          OT:       Last Filed Values         Value Time User    OT Discharge Needs  does not require ongoing therapy 9/12/2024  1:11 PM Blanca Ferris          SLP:    Last Filed Values       None            Mobility Equipment Recommended for Discharge: Pt owns RW      Barriers to Discharge  Identified Barriers to Discharge/Transition Planning:

## 2025-01-29 ENCOUNTER — OFFICE (OUTPATIENT)
Dept: URBAN - METROPOLITAN AREA CLINIC 122 | Facility: CLINIC | Age: 65
Setting detail: OPHTHALMOLOGY
End: 2025-01-29
Payer: COMMERCIAL

## 2025-01-29 DIAGNOSIS — H02.824: ICD-10-CM

## 2025-01-29 DIAGNOSIS — H43.393: ICD-10-CM

## 2025-01-29 DIAGNOSIS — H35.372: ICD-10-CM

## 2025-01-29 DIAGNOSIS — H35.363: ICD-10-CM

## 2025-01-29 DIAGNOSIS — H33.002: ICD-10-CM

## 2025-01-29 DIAGNOSIS — H25.11: ICD-10-CM

## 2025-01-29 DIAGNOSIS — H40.1131: ICD-10-CM

## 2025-01-29 DIAGNOSIS — H16.223: ICD-10-CM

## 2025-01-29 PROCEDURE — 92020 GONIOSCOPY: CPT | Performed by: OPHTHALMOLOGY

## 2025-01-29 PROCEDURE — 92134 CPTRZ OPH DX IMG PST SGM RTA: CPT | Performed by: OPHTHALMOLOGY

## 2025-01-29 PROCEDURE — 92012 INTRM OPH EXAM EST PATIENT: CPT | Performed by: OPHTHALMOLOGY

## 2025-01-29 ASSESSMENT — REFRACTION_MANIFEST
OD_ADD: +2.50
OD_VA1: 20/20
OS_AXIS: 165
OS_SPHERE: +0.25
OD_SPHERE: -0.25
OS_VA1: 20/20
OD_SPHERE: -0.50
OS_CYLINDER: -0.50
OD_CYLINDER: -0.50
OS_AXIS: 155
OD_ADD: +2.50
OD_AXIS: 115
OS_CYLINDER: -0.50
OS_ADD: +2.50
OD_CYLINDER: -0.75
OU_VA: 20/20
OD_AXIS: 105
OS_ADD: +2.50
OS_SPHERE: -3.75
OD_SPHERE: -1.00

## 2025-01-29 ASSESSMENT — REFRACTION_CURRENTRX
OD_VPRISM_DIRECTION: PROGS
OD_OVR_VA: 20/
OD_AXIS: 115
OD_OVR_VA: 20/
OD_ADD: +2.50
OD_OVR_VA: 20/
OD_CYLINDER: -0.50
OS_AXIS: 165
OS_OVR_VA: 20/
OS_VPRISM_DIRECTION: PROGS
OS_ADD: +2.50
OS_OVR_VA: 20/
OD_SPHERE: -0.25
OS_OVR_VA: 20/
OS_SPHERE: +0.25
OS_CYLINDER: -0.50

## 2025-01-29 ASSESSMENT — KERATOMETRY
OS_K2POWER_DIOPTERS: 44.50
OD_K1POWER_DIOPTERS: 43.75
OS_K1POWER_DIOPTERS: 43.50
OD_K2POWER_DIOPTERS: 44.25
OS_AXISANGLE_DEGREES: 083
OD_AXISANGLE_DEGREES: 047

## 2025-01-29 ASSESSMENT — REFRACTION_AUTOREFRACTION
OD_SPHERE: -0.25
OS_AXIS: 164
OD_AXIS: 115
OS_SPHERE: +0.25
OS_CYLINDER: -0.50
OD_CYLINDER: -0.50

## 2025-01-29 ASSESSMENT — PACHYMETRY
OS_CT_UM: 527
OS_CT_CORRECTION: 1
OD_CT_CORRECTION: 1
OD_CT_UM: 527

## 2025-01-29 ASSESSMENT — TEAR BREAK UP TIME (TBUT)
OS_TBUT: 2+
OD_TBUT: 2+

## 2025-01-29 ASSESSMENT — VISUAL ACUITY
OD_BCVA: 20/30
OS_BCVA: 20/30-2

## 2025-01-29 ASSESSMENT — LID EXAM ASSESSMENTS
OS_EDEMA: ABSENT
OD_EDEMA: ABSENT

## 2025-02-05 ENCOUNTER — OFFICE (OUTPATIENT)
Dept: URBAN - METROPOLITAN AREA CLINIC 122 | Facility: CLINIC | Age: 65
Setting detail: OPHTHALMOLOGY
End: 2025-02-05
Payer: COMMERCIAL

## 2025-02-05 DIAGNOSIS — H02.824: ICD-10-CM

## 2025-02-05 DIAGNOSIS — H35.363: ICD-10-CM

## 2025-02-05 DIAGNOSIS — H16.223: ICD-10-CM

## 2025-02-05 DIAGNOSIS — H40.1131: ICD-10-CM

## 2025-02-05 DIAGNOSIS — H25.11: ICD-10-CM

## 2025-02-05 PROCEDURE — 92012 INTRM OPH EXAM EST PATIENT: CPT | Performed by: OPHTHALMOLOGY

## 2025-02-05 ASSESSMENT — REFRACTION_CURRENTRX
OS_SPHERE: +0.25
OD_OVR_VA: 20/
OS_VPRISM_DIRECTION: PROGS
OD_AXIS: 115
OD_SPHERE: -0.25
OS_OVR_VA: 20/
OD_VPRISM_DIRECTION: PROGS
OS_AXIS: 165
OS_CYLINDER: -0.50
OS_ADD: +2.50
OD_ADD: +2.50
OD_CYLINDER: -0.50

## 2025-02-05 ASSESSMENT — REFRACTION_MANIFEST
OD_AXIS: 115
OD_ADD: +2.50
OD_SPHERE: -0.25
OS_AXIS: 155
OD_CYLINDER: -0.50
OS_ADD: +2.50
OU_VA: 20/20
OS_ADD: +2.50
OD_SPHERE: -0.50
OS_CYLINDER: -0.50
OD_VA1: 20/20
OS_VA1: 20/20
OS_CYLINDER: -0.50
OD_CYLINDER: -0.75
OD_SPHERE: -1.00
OS_AXIS: 165
OS_SPHERE: +0.25
OD_AXIS: 105
OD_ADD: +2.50
OS_SPHERE: -3.75

## 2025-02-05 ASSESSMENT — PACHYMETRY
OD_CT_CORRECTION: 1
OD_CT_UM: 527
OS_CT_CORRECTION: 1
OS_CT_UM: 527

## 2025-02-05 ASSESSMENT — REFRACTION_AUTOREFRACTION
OD_SPHERE: -0.25
OS_CYLINDER: -0.50
OD_AXIS: 115
OD_CYLINDER: -0.50
OS_SPHERE: +0.25
OS_AXIS: 164

## 2025-02-05 ASSESSMENT — KERATOMETRY
OS_K2POWER_DIOPTERS: 44.50
OS_AXISANGLE_DEGREES: 083
OS_K1POWER_DIOPTERS: 43.50
OD_K1POWER_DIOPTERS: 43.75
OD_AXISANGLE_DEGREES: 047
OD_K2POWER_DIOPTERS: 44.25

## 2025-02-05 ASSESSMENT — TEAR BREAK UP TIME (TBUT)
OD_TBUT: 2+
OS_TBUT: 2+

## 2025-02-05 ASSESSMENT — TONOMETRY
OD_IOP_MMHG: 18
OS_IOP_MMHG: 20

## 2025-02-05 ASSESSMENT — LID EXAM ASSESSMENTS
OS_EDEMA: ABSENT
OD_EDEMA: ABSENT

## 2025-02-05 ASSESSMENT — VISUAL ACUITY
OS_BCVA: 20/30-2
OD_BCVA: 20/30

## 2025-05-22 ENCOUNTER — OFFICE (OUTPATIENT)
Dept: URBAN - METROPOLITAN AREA CLINIC 122 | Facility: CLINIC | Age: 65
Setting detail: OPHTHALMOLOGY
End: 2025-05-22
Payer: COMMERCIAL

## 2025-05-22 DIAGNOSIS — H40.1131: ICD-10-CM

## 2025-05-22 DIAGNOSIS — H16.223: ICD-10-CM

## 2025-05-22 DIAGNOSIS — H35.363: ICD-10-CM

## 2025-05-22 DIAGNOSIS — H02.824: ICD-10-CM

## 2025-05-22 PROCEDURE — 92083 EXTENDED VISUAL FIELD XM: CPT | Performed by: OPHTHALMOLOGY

## 2025-05-22 PROCEDURE — 92012 INTRM OPH EXAM EST PATIENT: CPT | Performed by: OPHTHALMOLOGY

## 2025-05-22 PROCEDURE — 92133 CPTRZD OPH DX IMG PST SGM ON: CPT | Performed by: OPHTHALMOLOGY

## 2025-05-22 ASSESSMENT — REFRACTION_AUTOREFRACTION
OS_AXIS: 164
OS_SPHERE: +0.25
OD_SPHERE: -0.25
OD_CYLINDER: -0.50
OD_AXIS: 115
OS_CYLINDER: -0.50

## 2025-05-22 ASSESSMENT — REFRACTION_MANIFEST
OS_AXIS: 155
OU_VA: 20/20
OD_AXIS: 115
OS_SPHERE: -3.75
OD_ADD: +2.50
OD_SPHERE: -1.00
OD_VA1: 20/20
OS_AXIS: 165
OD_ADD: +2.50
OS_CYLINDER: -0.50
OS_VA1: 20/20
OS_SPHERE: +0.25
OD_CYLINDER: -0.75
OD_SPHERE: -0.25
OS_ADD: +2.50
OS_ADD: +2.50
OD_AXIS: 105
OD_CYLINDER: -0.50
OD_SPHERE: -0.50
OS_CYLINDER: -0.50

## 2025-05-22 ASSESSMENT — LID EXAM ASSESSMENTS
OD_EDEMA: ABSENT
OS_EDEMA: ABSENT

## 2025-05-22 ASSESSMENT — REFRACTION_CURRENTRX
OD_CYLINDER: -0.50
OS_CYLINDER: -0.50
OD_VPRISM_DIRECTION: PROGS
OD_ADD: +2.50
OS_SPHERE: +0.25
OS_AXIS: 165
OD_SPHERE: -0.25
OS_OVR_VA: 20/
OS_ADD: +2.50
OS_VPRISM_DIRECTION: PROGS
OD_OVR_VA: 20/
OD_AXIS: 115

## 2025-05-22 ASSESSMENT — TONOMETRY
OS_IOP_MMHG: 17
OD_IOP_MMHG: 18

## 2025-05-22 ASSESSMENT — KERATOMETRY
OS_K2POWER_DIOPTERS: 44.50
OD_AXISANGLE_DEGREES: 047
OS_K1POWER_DIOPTERS: 43.50
OD_K2POWER_DIOPTERS: 44.25
OD_K1POWER_DIOPTERS: 43.75
OS_AXISANGLE_DEGREES: 083

## 2025-05-22 ASSESSMENT — VISUAL ACUITY
OD_BCVA: 20/20-
OS_BCVA: 20/20

## 2025-05-22 ASSESSMENT — PACHYMETRY
OS_CT_CORRECTION: 1
OD_CT_CORRECTION: 1
OD_CT_UM: 527
OS_CT_UM: 527

## 2025-05-22 ASSESSMENT — TEAR BREAK UP TIME (TBUT)
OD_TBUT: 2+
OS_TBUT: 2+